# Patient Record
Sex: FEMALE | Race: OTHER | HISPANIC OR LATINO | URBAN - METROPOLITAN AREA
[De-identification: names, ages, dates, MRNs, and addresses within clinical notes are randomized per-mention and may not be internally consistent; named-entity substitution may affect disease eponyms.]

---

## 2020-09-10 ENCOUNTER — EMERGENCY (EMERGENCY)
Facility: HOSPITAL | Age: 30
LOS: 1 days | Discharge: HOME | End: 2020-09-10
Attending: EMERGENCY MEDICINE | Admitting: EMERGENCY MEDICINE
Payer: COMMERCIAL

## 2020-09-10 VITALS
OXYGEN SATURATION: 100 % | TEMPERATURE: 98 F | SYSTOLIC BLOOD PRESSURE: 140 MMHG | DIASTOLIC BLOOD PRESSURE: 92 MMHG | RESPIRATION RATE: 20 BRPM | HEART RATE: 97 BPM

## 2020-09-10 LAB
ALBUMIN SERPL ELPH-MCNC: 3.9 G/DL — SIGNIFICANT CHANGE UP (ref 3.3–5)
ALP SERPL-CCNC: 135 U/L — HIGH (ref 40–120)
ALT FLD-CCNC: 42 U/L — HIGH (ref 4–33)
ANION GAP SERPL CALC-SCNC: 13 MMO/L — SIGNIFICANT CHANGE UP (ref 7–14)
APPEARANCE UR: CLEAR — SIGNIFICANT CHANGE UP
AST SERPL-CCNC: 65 U/L — HIGH (ref 4–32)
BACTERIA # UR AUTO: NEGATIVE — SIGNIFICANT CHANGE UP
BASE EXCESS BLDV CALC-SCNC: 0.1 MMOL/L — SIGNIFICANT CHANGE UP
BASOPHILS # BLD AUTO: 0.04 K/UL — SIGNIFICANT CHANGE UP (ref 0–0.2)
BASOPHILS NFR BLD AUTO: 0.3 % — SIGNIFICANT CHANGE UP (ref 0–2)
BILIRUB SERPL-MCNC: 0.4 MG/DL — SIGNIFICANT CHANGE UP (ref 0.2–1.2)
BILIRUB UR-MCNC: NEGATIVE — SIGNIFICANT CHANGE UP
BLOOD GAS VENOUS - CREATININE: 1.16 MG/DL — SIGNIFICANT CHANGE UP (ref 0.5–1.3)
BLOOD UR QL VISUAL: SIGNIFICANT CHANGE UP
BUN SERPL-MCNC: 19 MG/DL — SIGNIFICANT CHANGE UP (ref 7–23)
CALCIUM SERPL-MCNC: 9.3 MG/DL — SIGNIFICANT CHANGE UP (ref 8.4–10.5)
CHLORIDE BLDV-SCNC: 103 MMOL/L — SIGNIFICANT CHANGE UP (ref 96–108)
CHLORIDE SERPL-SCNC: 97 MMOL/L — LOW (ref 98–107)
CO2 SERPL-SCNC: 20 MMOL/L — LOW (ref 22–31)
COLOR SPEC: YELLOW — SIGNIFICANT CHANGE UP
CREAT SERPL-MCNC: 1.06 MG/DL — SIGNIFICANT CHANGE UP (ref 0.5–1.3)
EOSINOPHIL # BLD AUTO: 0.12 K/UL — SIGNIFICANT CHANGE UP (ref 0–0.5)
EOSINOPHIL NFR BLD AUTO: 1 % — SIGNIFICANT CHANGE UP (ref 0–6)
GAS PNL BLDV: 136 MMOL/L — SIGNIFICANT CHANGE UP (ref 136–146)
GLUCOSE BLDV-MCNC: 93 MG/DL — SIGNIFICANT CHANGE UP (ref 70–99)
GLUCOSE SERPL-MCNC: 101 MG/DL — HIGH (ref 70–99)
GLUCOSE UR-MCNC: NEGATIVE — SIGNIFICANT CHANGE UP
HCO3 BLDV-SCNC: 23 MMOL/L — SIGNIFICANT CHANGE UP (ref 20–27)
HCT VFR BLD CALC: 38.2 % — SIGNIFICANT CHANGE UP (ref 34.5–45)
HCT VFR BLDV CALC: 45.4 % — HIGH (ref 34.5–45)
HGB BLD-MCNC: 12.6 G/DL — SIGNIFICANT CHANGE UP (ref 11.5–15.5)
HGB BLDV-MCNC: 14.8 G/DL — SIGNIFICANT CHANGE UP (ref 11.5–15.5)
HYALINE CASTS # UR AUTO: NEGATIVE — SIGNIFICANT CHANGE UP
IMM GRANULOCYTES NFR BLD AUTO: 0.8 % — SIGNIFICANT CHANGE UP (ref 0–1.5)
KETONES UR-MCNC: NEGATIVE — SIGNIFICANT CHANGE UP
LACTATE BLDV-MCNC: 2.3 MMOL/L — HIGH (ref 0.5–2)
LEUKOCYTE ESTERASE UR-ACNC: SIGNIFICANT CHANGE UP
LYMPHOCYTES # BLD AUTO: 1.8 K/UL — SIGNIFICANT CHANGE UP (ref 1–3.3)
LYMPHOCYTES # BLD AUTO: 15.4 % — SIGNIFICANT CHANGE UP (ref 13–44)
MCHC RBC-ENTMCNC: 28.4 PG — SIGNIFICANT CHANGE UP (ref 27–34)
MCHC RBC-ENTMCNC: 33 % — SIGNIFICANT CHANGE UP (ref 32–36)
MCV RBC AUTO: 86.2 FL — SIGNIFICANT CHANGE UP (ref 80–100)
MONOCYTES # BLD AUTO: 0.88 K/UL — SIGNIFICANT CHANGE UP (ref 0–0.9)
MONOCYTES NFR BLD AUTO: 7.5 % — SIGNIFICANT CHANGE UP (ref 2–14)
NEUTROPHILS # BLD AUTO: 8.79 K/UL — HIGH (ref 1.8–7.4)
NEUTROPHILS NFR BLD AUTO: 75 % — SIGNIFICANT CHANGE UP (ref 43–77)
NITRITE UR-MCNC: NEGATIVE — SIGNIFICANT CHANGE UP
NRBC # FLD: 0 K/UL — SIGNIFICANT CHANGE UP (ref 0–0)
PCO2 BLDV: 42 MMHG — SIGNIFICANT CHANGE UP (ref 41–51)
PH BLDV: 7.38 PH — SIGNIFICANT CHANGE UP (ref 7.32–7.43)
PH UR: 7 — SIGNIFICANT CHANGE UP (ref 5–8)
PLATELET # BLD AUTO: 362 K/UL — SIGNIFICANT CHANGE UP (ref 150–400)
PMV BLD: 9.1 FL — SIGNIFICANT CHANGE UP (ref 7–13)
PO2 BLDV: 26 MMHG — LOW (ref 35–40)
POTASSIUM BLDV-SCNC: 3.4 MMOL/L — SIGNIFICANT CHANGE UP (ref 3.4–4.5)
POTASSIUM SERPL-MCNC: SIGNIFICANT CHANGE UP MMOL/L (ref 3.5–5.3)
POTASSIUM SERPL-SCNC: SIGNIFICANT CHANGE UP MMOL/L (ref 3.5–5.3)
PROT SERPL-MCNC: 8.7 G/DL — HIGH (ref 6–8.3)
PROT UR-MCNC: 10 — SIGNIFICANT CHANGE UP
RBC # BLD: 4.43 M/UL — SIGNIFICANT CHANGE UP (ref 3.8–5.2)
RBC # FLD: 11.8 % — SIGNIFICANT CHANGE UP (ref 10.3–14.5)
RBC CASTS # UR COMP ASSIST: HIGH (ref 0–?)
SAO2 % BLDV: 39 % — LOW (ref 60–85)
SODIUM SERPL-SCNC: 130 MMOL/L — LOW (ref 135–145)
SP GR SPEC: 1.02 — SIGNIFICANT CHANGE UP (ref 1–1.04)
SQUAMOUS # UR AUTO: SIGNIFICANT CHANGE UP
UROBILINOGEN FLD QL: NORMAL — SIGNIFICANT CHANGE UP
WBC # BLD: 11.72 K/UL — HIGH (ref 3.8–10.5)
WBC # FLD AUTO: 11.72 K/UL — HIGH (ref 3.8–10.5)
WBC UR QL: HIGH (ref 0–?)

## 2020-09-10 PROCEDURE — 99284 EMERGENCY DEPT VISIT MOD MDM: CPT

## 2020-09-10 PROCEDURE — 74176 CT ABD & PELVIS W/O CONTRAST: CPT | Mod: 26

## 2020-09-10 RX ORDER — ONDANSETRON 8 MG/1
4 TABLET, FILM COATED ORAL ONCE
Refills: 0 | Status: COMPLETED | OUTPATIENT
Start: 2020-09-10 | End: 2020-09-10

## 2020-09-10 RX ORDER — KETOROLAC TROMETHAMINE 30 MG/ML
15 SYRINGE (ML) INJECTION ONCE
Refills: 0 | Status: DISCONTINUED | OUTPATIENT
Start: 2020-09-10 | End: 2020-09-10

## 2020-09-10 RX ORDER — CEFTRIAXONE 500 MG/1
1000 INJECTION, POWDER, FOR SOLUTION INTRAMUSCULAR; INTRAVENOUS ONCE
Refills: 0 | Status: COMPLETED | OUTPATIENT
Start: 2020-09-10 | End: 2020-09-10

## 2020-09-10 RX ORDER — SODIUM CHLORIDE 9 MG/ML
1000 INJECTION INTRAMUSCULAR; INTRAVENOUS; SUBCUTANEOUS ONCE
Refills: 0 | Status: COMPLETED | OUTPATIENT
Start: 2020-09-10 | End: 2020-09-10

## 2020-09-10 RX ADMIN — SODIUM CHLORIDE 1000 MILLILITER(S): 9 INJECTION INTRAMUSCULAR; INTRAVENOUS; SUBCUTANEOUS at 20:56

## 2020-09-10 RX ADMIN — Medication 15 MILLIGRAM(S): at 20:55

## 2020-09-10 RX ADMIN — CEFTRIAXONE 100 MILLIGRAM(S): 500 INJECTION, POWDER, FOR SOLUTION INTRAMUSCULAR; INTRAVENOUS at 23:18

## 2020-09-10 RX ADMIN — ONDANSETRON 4 MILLIGRAM(S): 8 TABLET, FILM COATED ORAL at 20:56

## 2020-09-10 NOTE — ED PROVIDER NOTE - CARE PROVIDERS DIRECT ADDRESSES
[email or web address]

## 2020-09-10 NOTE — ED PROVIDER NOTE - OBJECTIVE STATEMENT
Monegasque speaking ID# [id number]:  31 y/o female with no pmhx presents to ED c/o left flank pain x 1 week. Associated with burning with urination and n/v. Pain radiates to LLQ. Pt states she was admitted at a "hospitals" for 6 days for 2 left sided kidney stones and discharged today. Pt came to ED today due to persistent pain and unable to tolerate po. Pt has been taking Bactrim for 6 days. Pt has not tried pain medication at home. No fever, chills, cough, hematuria.

## 2020-09-10 NOTE — ED PROVIDER NOTE - PATIENT PORTAL LINK FT
You can access the FollowMyHealth Patient Portal offered by [facility] by registering at the following website: [email or web address]. By joining [employer]’s FollowMyHealth portal, you will also be able to view your health information using other applications (apps) compatible with our system.

## 2020-09-10 NOTE — ED PROVIDER NOTE - CLINICAL SUMMARY MEDICAL DECISION MAKING FREE TEXT BOX
31 y/o female with no pmhx presents to ED c/o left flank pain x 1 week. Associated with burning with urination and n/v. Pain radiates to LLQ. Pt states she was admitted at a "[facility]" for 6 days for 2 left sided kidney stones and discharged today. Pt came to ED today due to persistent pain and unable to tolerate po. Pt has been taking Bactrim for 6 days. pt well appearing, +left cva tenderness, uncomfortable. plan to check labs, ua and cx, pain control, hydrate, and check CT abdomen/pelvis noncontrast.

## 2020-09-10 NOTE — ED PROVIDER NOTE - CARE PROVIDER_API CALL
Yovany Martinez  UROLOGY  [address] [place] [address]  Phone: [phone]  Fax: [phone]  Follow Up Time: 4-6 Days

## 2020-09-10 NOTE — ED PROVIDER NOTE - NSFOLLOWUPINSTRUCTIONS_ED_ALL_ED_FT
1. A copy of any of your resulted labs, imaging, and findings have been provided and discussed with you.   2. Follow up with urology within 1 week with Dr. Martinez as discussed.  3. Take ibuprofen 600mg every 6 hours as needed for pain. For severe pain can take oxycodone as prescribed.  4. Take cefpodoxime as prescribed.   5. Return immediately to the emergency department for new, persistent, or worsening symptoms or signs.

## 2020-09-10 NOTE — ED ADULT NURSE NOTE - OBJECTIVE STATEMENT
Pt presents to intake room 1, a&ox4, ambulatory, c/o left flank pain and nausea. Pt states the pain started on Saturday and has gotten worse since. Pt states she has pain when urinating. Pt denies any headaches, chest pain, shortness of breath. Respirations even and unlabored. Abdomen soft and nondistended. Skin warm and dry. 20 gauge IV placed in left hand. Blood drawn and sent to lab. Comfort measurs provided. medications given as per MD orders. Will continue to monitor.

## 2020-09-10 NOTE — ED PROVIDER NOTE - PROGRESS NOTE DETAILS
Jaye BRANNON: patient signed out to me by Dr. Medellin. Patient found with large obstructing kidney stones. Urology consulted. Urology offered patient stent however, patient declining. Per urology cleared for discharge. States abx not required but will leave judgement to ED team. Patient to follow up with Dr. Martinez. Patient understanding. Patient well appearing, pain resolved.

## 2020-09-10 NOTE — ED PROVIDER NOTE - CARE PLAN
Principal Discharge DX:	Pyelonephritis Principal Discharge DX:	Kidney stone  Secondary Diagnosis:	Hydronephrosis

## 2020-09-10 NOTE — ED PROVIDER NOTE - ATTENDING CONTRIBUTION TO CARE
DR. GATES, ATTENDING MD-  I performed a face to face bedside interview with the patient regarding history of present illness, review of symptoms and past medical history. I completed an independent physical exam.  I have discussed the patient's plan of care with the PA.   Documentation as above in the note.    31 y/o female [facility], discharged today with bactrim, arrives to ED c/o left flank pain, dysuria, nausea.  Evaluate for pyelo, kidney stone, infected stone.  Obtain cbc cmp hcg ct a/p give ivf pain med reassess.

## 2020-09-11 VITALS
SYSTOLIC BLOOD PRESSURE: 158 MMHG | OXYGEN SATURATION: 100 % | DIASTOLIC BLOOD PRESSURE: 92 MMHG | RESPIRATION RATE: 18 BRPM | HEART RATE: 72 BPM | TEMPERATURE: 98 F

## 2020-09-11 PROBLEM — Z00.00 ENCOUNTER FOR PREVENTIVE HEALTH EXAMINATION: Status: ACTIVE | Noted: 2020-09-11

## 2020-09-11 LAB
ALBUMIN SERPL ELPH-MCNC: 3.5 G/DL — SIGNIFICANT CHANGE UP (ref 3.3–5)
ALP SERPL-CCNC: 114 U/L — SIGNIFICANT CHANGE UP (ref 40–120)
ALT FLD-CCNC: 30 U/L — SIGNIFICANT CHANGE UP (ref 4–33)
ANION GAP SERPL CALC-SCNC: 12 MMO/L — SIGNIFICANT CHANGE UP (ref 7–14)
AST SERPL-CCNC: 19 U/L — SIGNIFICANT CHANGE UP (ref 4–32)
BILIRUB SERPL-MCNC: 0.2 MG/DL — SIGNIFICANT CHANGE UP (ref 0.2–1.2)
BUN SERPL-MCNC: 16 MG/DL — SIGNIFICANT CHANGE UP (ref 7–23)
CALCIUM SERPL-MCNC: 8.4 MG/DL — SIGNIFICANT CHANGE UP (ref 8.4–10.5)
CHLORIDE SERPL-SCNC: 101 MMOL/L — SIGNIFICANT CHANGE UP (ref 98–107)
CO2 SERPL-SCNC: 20 MMOL/L — LOW (ref 22–31)
CREAT SERPL-MCNC: 1.02 MG/DL — SIGNIFICANT CHANGE UP (ref 0.5–1.3)
GLUCOSE SERPL-MCNC: 108 MG/DL — HIGH (ref 70–99)
POTASSIUM SERPL-MCNC: 4.1 MMOL/L — SIGNIFICANT CHANGE UP (ref 3.5–5.3)
POTASSIUM SERPL-SCNC: 4.1 MMOL/L — SIGNIFICANT CHANGE UP (ref 3.5–5.3)
PROT SERPL-MCNC: 7.1 G/DL — SIGNIFICANT CHANGE UP (ref 6–8.3)
SODIUM SERPL-SCNC: 133 MMOL/L — LOW (ref 135–145)

## 2020-09-11 RX ORDER — CEFPODOXIME PROXETIL 100 MG
1 TABLET ORAL
Qty: 20 | Refills: 0
Start: 2020-09-11 | End: 2020-09-20

## 2020-09-11 RX ORDER — OXYCODONE HYDROCHLORIDE 5 MG/1
1 TABLET ORAL
Qty: 9 | Refills: 0
Start: 2020-09-11

## 2020-09-11 NOTE — CONSULT NOTE ADULT - SUBJECTIVE AND OBJECTIVE BOX
HPI  29 yo F with left nephrolithiasis no other PMHx presents after discharge from [facility] hospital earlier today with continued pain. She was admitted there for pain only for 5 or 6 days and denies fever/chills at any time. She denies dysuria, frequency, urgency, incontinence, or difficulty emptying. She was on Bactrim at [facility] but was discharged without any pain medications.    PAST MEDICAL & SURGICAL HISTORY:  No pertinent past medical history  No significant past surgical history      MEDICATIONS  (STANDING):    MEDICATIONS  (PRN):      FAMILY HISTORY:      Allergies    No Known Allergies    Intolerances        SOCIAL HISTORY:    REVIEW OF SYSTEMS: Otherwise negative as stated in HPI    Physical Exam  Vital signs  T(C): 36.8 (20 @ 02:10), Max: 36.8 (09-10-20 @ 19:09)  HR: 72 (20 @ 02:10)  BP: 158/92 (20 @ 02:10)  SpO2: 100% (20 @ 02:10)  Wt(kg): --    Output    UOP    Gen:  NAD    Pulm:  No respiratory distress  	  CV:  RRR    GI:  S/ND/NT    :  +left mild CVAT    MSK:      LABS:       @ 00:25    WBC --    / Hct --    / SCr 1.02     09-10 @ 22:30    WBC 11.72 / Hct 38.2  / SCr --           133<L>  |  101  |  16  ----------------------------<  108<H>  4.1   |  20<L>  |  1.02    Ca    8.4      11 Sep 2020 00:25    TPro  7.1  /  Alb  3.5  /  TBili  0.2  /  DBili  x   /  AST  19  /  ALT  30  /  AlkPhos  114  09-11      Urinalysis Basic - ( 10 Sep 2020 20:21 )    Color: YELLOW / Appearance: CLEAR / S.021 / pH: 7.0  Gluc: NEGATIVE / Ketone: NEGATIVE  / Bili: NEGATIVE / Urobili: NORMAL   Blood: TRACE / Protein: 10 / Nitrite: NEGATIVE   Leuk Esterase: TRACE / RBC: 6-10 / WBC 11-25   Sq Epi: OCC / Non Sq Epi: x / Bacteria: NEGATIVE        Urine Cx:  Blood Cx:    RADIOLOGY:

## 2020-09-11 NOTE — CONSULT NOTE ADULT - ASSESSMENT
31 yo F with multiple left sided stones    - pain controlled in ER  - follow-up urine culture, UA not overtly positive  - ER plans to send abx which is fine  - requesting date for surgery to remove stones  - given size likely to require PCNL + URS for improved clearance of stone burden  - given contact information for [facility]  - follow-up with Dr. Martinez or Hoenig for surgical planning

## 2020-09-11 NOTE — ED ADULT NURSE REASSESSMENT NOTE - NS ED NURSE REASSESS COMMENT FT1
Pt awake and alert and in nad. Vitals as noted. Respirations even and unlabored. MD at beside. Pt to be discharged.

## 2020-09-12 LAB
CULTURE RESULTS: NO GROWTH — SIGNIFICANT CHANGE UP
SPECIMEN SOURCE: SIGNIFICANT CHANGE UP

## 2020-09-14 ENCOUNTER — APPOINTMENT (OUTPATIENT)
Dept: UROLOGY | Facility: CLINIC | Age: 30
End: 2020-09-14
Payer: MEDICAID

## 2020-09-14 VITALS
DIASTOLIC BLOOD PRESSURE: 82 MMHG | HEIGHT: 64 IN | WEIGHT: 172 LBS | HEART RATE: 74 BPM | BODY MASS INDEX: 29.37 KG/M2 | SYSTOLIC BLOOD PRESSURE: 146 MMHG

## 2020-09-14 VITALS — TEMPERATURE: 98 F

## 2020-09-14 DIAGNOSIS — N20.0 CALCULUS OF KIDNEY: ICD-10-CM

## 2020-09-14 DIAGNOSIS — N20.1 CALCULUS OF URETER: ICD-10-CM

## 2020-09-14 PROCEDURE — 99204 OFFICE O/P NEW MOD 45 MIN: CPT

## 2020-09-14 RX ORDER — CEFPODOXIME PROXETIL 200 MG/1
TABLET, FILM COATED ORAL
Refills: 0 | Status: ACTIVE | COMMUNITY

## 2020-09-14 NOTE — HISTORY OF PRESENT ILLNESS
[FreeTextEntry1] : Lenore JARA is a 30 year presenting for evaluation of kidney stones. She first noticed symptoms of left flank pain and subjective fevers 2 weeks ago. She presented to the [facility] ED where she was told she had stones and admitted in the setting of having a fever. She was admitted for 5 days but no interventions were performed and she was discharged home on Bactrim. Shortly thereafter, she presented to the [facility] ED on 9/11 with continued flank pain. CT performed at [facility] demonstrating  1.2 cm in the left mid ureter and a 1.8 cm stone in the left distal ureter. A third 2 cm stone was also noted in the left lower pole. Lab work performed in the [facility] ED significant only for a WBC to 11, with Cr within normal limits and urine culture obtained at the time no growth to date. She was sent home from the [facility] ED on cefpodoxime and analgesia with ibuprofen and oxycodone.  \par \par Today she comes to clinic with her sister-in-law. Patient endorses continued left flank pain. No fevers, chills, nausea/vomiting. Does states she has been taking in less by mouth although she is still eating/drinking some. 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

## 2020-09-14 NOTE — PHYSICAL EXAM
[General Appearance - Well Developed] : well developed [General Appearance - Well Nourished] : well nourished [Well Groomed] : well groomed [Edema] : no peripheral edema [Respiration, Rhythm And Depth] : normal respiratory rhythm and effort [Exaggerated Use Of Accessory Muscles For Inspiration] : no accessory muscle use [Abdomen Soft] : soft [] : no rash [Normal Station and Gait] : the gait and station were normal for the patient's age [No Focal Deficits] : no focal deficits [Oriented To Time, Place, And Person] : oriented to person, place, and time [Mood] : the mood was normal [Affect] : the affect was normal [Not Anxious] : not anxious [FreeTextEntry1] : mild CVA tenderness on the left  [No Palpable Adenopathy] : no palpable adenopathy [Urinary Bladder Findings] : the bladder was normal on palpation

## 2020-09-14 NOTE — ASSESSMENT
[FreeTextEntry1] : 31 yo F otherwise healthy presenting with a large ureteral and renal stone burden on the left. Given the size of her stones, all three are unlikely to pass on their own without intervention. Furthermore given the size and distribution of her stones, she will likely need a percutaneous nephrolithotomy (PCNL) with ureteroscopy to deal with her stone burden. Dr. Yovany Martinez was able to see and evaluate the patient today in clinic as well today and agreed that she would need a PCNL to help address her stones. He plans to take her to the OR for this at the beginning of October. \par \par I spoke with the patient on ongoing management for her symptomatic stones until her surgery date.  I offered her the option of a ureteral stent placement to remain in place until her PCNL. I explained that this would only be a temporizing measure and she would still need a PCNL for treatment. I also explained that while a stent may give her some relief of her flank pain, some patients do have ongoing discomfort from the stent itself. After discussion, she has decided to continue without a stent for now and wait until her PCNL in October. I encouraged her to continue to use non-opiate analgesia first for her renal colic and only use narcotics for breakthrough. \par \par - PCNL with Dr. Martinez in October for treatment of left sided stone burden\par - Analgesia w/ NSAID, Tylenol. Oxy from ED for breakthrough

## 2020-09-14 NOTE — REVIEW OF SYSTEMS
[Fever] : fever [Constipation] : constipation [Blood in urine that you can see] : blood visible in urine [Pain during urination] : pain during urination [Negative] : Heme/Lymph

## 2020-09-20 PROBLEM — Z78.9 OTHER SPECIFIED HEALTH STATUS: Chronic | Status: ACTIVE | Noted: 2020-09-10

## 2020-09-26 DIAGNOSIS — Z01.818 ENCOUNTER FOR OTHER PREPROCEDURAL EXAMINATION: ICD-10-CM

## 2020-09-28 ENCOUNTER — APPOINTMENT (OUTPATIENT)
Dept: DISASTER EMERGENCY | Facility: CLINIC | Age: 30
End: 2020-09-28

## 2020-09-29 LAB — SARS-COV-2 N GENE NPH QL NAA+PROBE: NOT DETECTED

## 2020-10-01 ENCOUNTER — APPOINTMENT (OUTPATIENT)
Dept: UROLOGY | Facility: HOSPITAL | Age: 30
End: 2020-10-01

## 2020-10-08 ENCOUNTER — APPOINTMENT (OUTPATIENT)
Dept: ULTRASOUND IMAGING | Facility: CLINIC | Age: 30
End: 2020-10-08
Payer: COMMERCIAL

## 2020-10-08 ENCOUNTER — OUTPATIENT (OUTPATIENT)
Dept: OUTPATIENT SERVICES | Facility: HOSPITAL | Age: 30
LOS: 1 days | End: 2020-10-08
Payer: COMMERCIAL

## 2020-10-08 DIAGNOSIS — N20.0 CALCULUS OF KIDNEY: ICD-10-CM

## 2020-10-08 PROCEDURE — 76770 US EXAM ABDO BACK WALL COMP: CPT | Mod: 26

## 2020-10-08 PROCEDURE — 76770 US EXAM ABDO BACK WALL COMP: CPT

## 2020-10-20 ENCOUNTER — NON-APPOINTMENT (OUTPATIENT)
Age: 30
End: 2020-10-20

## 2023-08-24 ENCOUNTER — NON-APPOINTMENT (OUTPATIENT)
Age: 33
End: 2023-08-24

## 2023-08-24 DIAGNOSIS — R11.0 NAUSEA: ICD-10-CM

## 2023-09-14 RX ORDER — DOXYLAMINE SUCCINATE AND PYRIDOXINE HYDROCHLORIDE 10; 10 MG/1; MG/1
10-10 TABLET, DELAYED RELEASE ORAL
Qty: 28 | Refills: 0 | Status: ACTIVE | COMMUNITY
Start: 2023-08-24 | End: 1900-01-01

## 2024-06-06 LAB
HLA DPB1 HIGH RESOLUTION: SIGNIFICANT CHANGE UP
HLA-A HIGH: SIGNIFICANT CHANGE UP
HLA-B HIGH: SIGNIFICANT CHANGE UP
HLA-C HIGH: SIGNIFICANT CHANGE UP
HLA-DPA1: SIGNIFICANT CHANGE UP
HLA-DQA1: SIGNIFICANT CHANGE UP
HLA-DQB1 HIGH RES: SIGNIFICANT CHANGE UP
HLA-DRB1 HIGH RESOLUTION RESULT: SIGNIFICANT CHANGE UP
HLA-DRB3, 4, 5 HIGH: SIGNIFICANT CHANGE UP

## 2025-03-12 ENCOUNTER — OUTPATIENT (OUTPATIENT)
Dept: OUTPATIENT SERVICES | Facility: HOSPITAL | Age: 35
LOS: 1 days | Discharge: ANOTHER HOSPITAL | End: 2025-03-12
Payer: COMMERCIAL

## 2025-03-12 PROCEDURE — 90839 PSYTX CRISIS INITIAL 60 MIN: CPT | Mod: 95

## 2025-03-13 DIAGNOSIS — F43.23 ADJUSTMENT DISORDER WITH MIXED ANXIETY AND DEPRESSED MOOD: ICD-10-CM
